# Patient Record
Sex: FEMALE | URBAN - METROPOLITAN AREA
[De-identification: names, ages, dates, MRNs, and addresses within clinical notes are randomized per-mention and may not be internally consistent; named-entity substitution may affect disease eponyms.]

---

## 2020-03-06 ENCOUNTER — COMMUNICATION - HEALTHEAST (OUTPATIENT)
Dept: SCHEDULING | Facility: CLINIC | Age: 25
End: 2020-03-06

## 2021-06-06 NOTE — TELEPHONE ENCOUNTER
"Pt reports she was exposed to Influenza A from her mother last Monday. Pt reports she developed a tickle in her throat and cough on Thursday and then \"after got a fever, body aches, headache\". Started taking Tamiflu yesterday from a [facility] in [place]. No fever at this time per pt. Pt denies difficulty breathing or any other acute symptoms.    See Care Advice below for information per protocol reviewed with pt. Provided reassurance. Call back if symptoms worsen or new symptoms arise.    Pt verbalizes understanding and agrees to plan.     Reason for Disposition    Influenza, questions about    Answer Assessment - Initial Assessment Questions  1. DIAGNOSIS CONFIRMATION: \"When was the influenza diagnosed?\" \"By whom?\" \"Did you get a test for it?\"      Not confirmed, pt exposed to confirmed case of Influenza A from her mother  2. MEDICINES: \"Were you prescribed any medications for the influenza?\"  (e.g., zanamivir [Relenza], oseltamavir [Tamiflu]).       Tamiflu  3. ONSET of SYMPTOMS: \"When did your symptoms start?\"     This past Thursday   4. SYMPTOMS: \"What symptoms are you most concerned about?\" (e.g., runny nose, stuffy nose, sore throat, cough, breathing difficulty, fever)   Chills and sweating \"felt like had a fever but didn't check it\" . Pt denies current chills or sweating  5. COUGH: \"How bad is the cough?\"     \"not bad\"  6. FEVER: \"Do you have a fever?\" If so, ask: \"What is your temperature, how was it measured, and when did it start?\"      Not now  7. RESPIRATORY DISTRESS: \"Are you having any trouble breathing?\" If yes, ask: \"Describe your breathing.\"       \"fine\" \"no\" difficulty breathing   8. FLU VACCINE: \"Did you receive a flu shot this year?\" (e.g., seasonal influenza, H1N1)      Pt denies  9. PREGNANCY: \"Is there any chance you are pregnant?\" \"When was your last menstrual period?\"      Pt denies LMP \"exactly 30 days ago\"   10. HIGH RISK for COMPLICATIONS: \"Do you have any heart or lung problems? Do " "you have a weakened immune system?\" (e.g., CHF, COPD, asthma, HIV positive, chemotherapy, renal failure, diabetes mellitus, sickle cell anemia)        Pt denies    Protocols used: INFLUENZA FOLLOW-UP CALL-A-AH      "

## 2021-11-16 ENCOUNTER — LAB REQUISITION (OUTPATIENT)
Dept: LAB | Facility: CLINIC | Age: 26
End: 2021-11-16

## 2021-11-16 DIAGNOSIS — Z01.419 ENCOUNTER FOR GYNECOLOGICAL EXAMINATION (GENERAL) (ROUTINE) WITHOUT ABNORMAL FINDINGS: ICD-10-CM

## 2021-11-16 PROCEDURE — G0123 SCREEN CERV/VAG THIN LAYER: HCPCS | Performed by: PHYSICIAN ASSISTANT

## 2021-11-16 PROCEDURE — 87624 HPV HI-RISK TYP POOLED RSLT: CPT | Performed by: PHYSICIAN ASSISTANT

## 2021-11-18 LAB
HUMAN PAPILLOMA VIRUS 16 DNA: NEGATIVE
HUMAN PAPILLOMA VIRUS 18 DNA: NEGATIVE
HUMAN PAPILLOMA VIRUS FINAL DIAGNOSIS: NORMAL
HUMAN PAPILLOMA VIRUS OTHER HR: NEGATIVE

## 2021-11-26 LAB
BKR LAB AP GYN ADEQUACY: NORMAL
BKR LAB AP GYN INTERPRETATION: NORMAL
BKR LAB AP HPV REFLEX: NORMAL
BKR LAB AP PREVIOUS ABNL DX: NORMAL
BKR LAB AP PREVIOUS ABNORMAL: NORMAL
PATH REPORT.COMMENTS IMP SPEC: NORMAL
PATH REPORT.COMMENTS IMP SPEC: NORMAL
PATH REPORT.RELEVANT HX SPEC: NORMAL